# Patient Record
Sex: FEMALE | Race: BLACK OR AFRICAN AMERICAN | Employment: UNEMPLOYED | URBAN - METROPOLITAN AREA
[De-identification: names, ages, dates, MRNs, and addresses within clinical notes are randomized per-mention and may not be internally consistent; named-entity substitution may affect disease eponyms.]

---

## 2023-05-08 ENCOUNTER — OFFICE VISIT (OUTPATIENT)
Dept: RHEUMATOLOGY | Age: 38
End: 2023-05-08
Payer: COMMERCIAL

## 2023-05-08 VITALS
WEIGHT: 148.6 LBS | HEIGHT: 60 IN | BODY MASS INDEX: 29.17 KG/M2 | HEART RATE: 91 BPM | SYSTOLIC BLOOD PRESSURE: 111 MMHG | DIASTOLIC BLOOD PRESSURE: 81 MMHG

## 2023-05-08 DIAGNOSIS — M13.0 POLYARTHRITIS OF MULTIPLE SITES: ICD-10-CM

## 2023-05-08 DIAGNOSIS — R76.8 POSITIVE ANA (ANTINUCLEAR ANTIBODY): Primary | ICD-10-CM

## 2023-05-08 DIAGNOSIS — R76.8 POSITIVE ANA (ANTINUCLEAR ANTIBODY): ICD-10-CM

## 2023-05-08 LAB — ERYTHROCYTE [SEDIMENTATION RATE] IN BLOOD: 51 MM/HR (ref 0–20)

## 2023-05-08 PROCEDURE — 99244 OFF/OP CNSLTJ NEW/EST MOD 40: CPT | Performed by: INTERNAL MEDICINE

## 2023-05-08 RX ORDER — OMEPRAZOLE 40 MG/1
40 CAPSULE, DELAYED RELEASE ORAL DAILY
COMMUNITY

## 2023-05-08 RX ORDER — HYDROCODONE BITARTRATE AND ACETAMINOPHEN 7.5; 325 MG/1; MG/1
TABLET ORAL
COMMUNITY

## 2023-05-08 RX ORDER — AMITRIPTYLINE HYDROCHLORIDE 10 MG/1
TABLET, FILM COATED ORAL
COMMUNITY
Start: 2022-12-23

## 2023-05-08 RX ORDER — FAMOTIDINE 40 MG/1
TABLET, FILM COATED ORAL
COMMUNITY
Start: 2022-12-19

## 2023-05-08 RX ORDER — LISINOPRIL 20 MG/1
TABLET ORAL
COMMUNITY
Start: 2022-02-21

## 2023-05-08 RX ORDER — CETIRIZINE HYDROCHLORIDE 10 MG/1
10 TABLET ORAL DAILY
Qty: 30 TABLET | Refills: 11 | COMMUNITY
Start: 2023-04-17 | End: 2024-04-16

## 2023-05-08 RX ORDER — BALSALAZIDE DISODIUM 750 MG/1
2250 CAPSULE ORAL
COMMUNITY
Start: 2022-12-23

## 2023-05-08 ASSESSMENT — JOINT PAIN
TOTAL NUMBER OF SWOLLEN JOINTS: 0
TOTAL NUMBER OF TENDER JOINTS: 13

## 2023-05-08 ASSESSMENT — ROUTINE ASSESSMENT OF PATIENT INDEX DATA (RAPID3)
ON A SCALE OF ONE TO TEN, CONSIDERING ALL THE WAYS IN WHICH ILLNESS AND HEALTH CONDITIONS MAY AFFECT YOU AT THIS TIME, PLEASE INDICATE BELOW HOW YOU ARE DOING:: 9
ON A SCALE OF ONE TO TEN, HOW DIFFICULT WAS IT FOR YOU TO COMPLETE THE LISTED DAILY PHYSICAL TASKS OVER THE LAST WEEK: 1.3
WHEN YOU AWAKENED IN THE MORNING OVER THE LAST WEEK, PLEASE INDICATE THE AMOUNT OF TIME IT TAKES UNTIL YOU ARE AS LIMBER AS YOU WILL BE FOR THE DAY: 15 MIN
ON A SCALE OF ONE TO TEN, HOW MUCH OF A PROBLEM HAS UNUSUAL FATIGUE OR TIREDNESS BEEN FOR YOU OVER THE PAST WEEK?: 5
ON A SCALE OF ONE TO TEN, HOW MUCH PAIN HAVE YOU HAD BECAUSE OF YOUR CONDITION OVER THE PAST WEEK?: 9

## 2023-05-08 NOTE — PROGRESS NOTES
Flor Tidwell M.D.  [facility]. [facility], [address] [place] [address]  Office : [phone], Fax: [phone]      2023    Dear Hernan Jimenez,    Thank you for asking me to assist in the care of your patient Sanjuanita Clement who was seen in my office on 2023 for evaluation of joint pain with an elevated ESR. I have included the full consultation note below. I will continue to follow your patient and will forward all future office visit notes to you. If you have any questions or concerns about any aspect of your patient's care, please do not hesitate to contact me. I look forward to continuing to care for this patient with you. Sincerely,    Dr. Analilia Arteaga NOTE  Date of Visit:  2023 9:44 AM    Patient Information:  Name:  Sanjuanita Clement  :  1985  Age:  40 y.o. Gender:  female    PHYSICIAN REQUESTING CONSULTATION:  . Chief Complaint:  Chief Complaint   Patient presents with    Consultation    Abnormal Test Results    Joint Pain     History of Present Illness:  Sanjuanita Clement is a 40 y.o. female who was referred for evaluation of joint pain with a positive JEZ with an elevated ESR. On talking to the patient she states she has had joints for 2 years now which began in her hands and hips. Has been seeing a orthopedist for her hip and knee problems and has had arthroscopic procedure of the left knee last year which helped relieve the buckling sensation but the pain after the surgery did not seem to have gotten any better. Has had bilateral elbow surgery to \"clean the arthritis out:\". On talking further to the patient she states that she has been under the care of pain management and has had cortisone injection last year into the L spine which has helped relieve the pain in her lower back. Her other current joint complaints are as mentioned below.     Medical records were thoroughly reviewed and

## 2023-05-09 LAB
CCP IGA+IGG SERPL IA-ACNC: 5 UNITS (ref 0–19)
ENA SS-A AB SER-ACNC: <0.2 AI (ref 0–0.9)
ENA SS-B AB SER-ACNC: <0.2 AI (ref 0–0.9)

## 2023-12-11 ENCOUNTER — OFFICE VISIT (OUTPATIENT)
Dept: RHEUMATOLOGY | Age: 38
End: 2023-12-11
Payer: COMMERCIAL

## 2023-12-11 VITALS
DIASTOLIC BLOOD PRESSURE: 81 MMHG | WEIGHT: 134.8 LBS | BODY MASS INDEX: 26.46 KG/M2 | HEART RATE: 78 BPM | SYSTOLIC BLOOD PRESSURE: 123 MMHG | HEIGHT: 60 IN

## 2023-12-11 DIAGNOSIS — R70.0 ELEVATED ERYTHROCYTE SEDIMENTATION RATE: ICD-10-CM

## 2023-12-11 DIAGNOSIS — M51.36 DEGENERATION OF LUMBAR INTERVERTEBRAL DISC: Primary | ICD-10-CM

## 2023-12-11 LAB — ERYTHROCYTE [SEDIMENTATION RATE] IN BLOOD: 23 MM/HR (ref 0–20)

## 2023-12-11 PROCEDURE — 99214 OFFICE O/P EST MOD 30 MIN: CPT | Performed by: INTERNAL MEDICINE

## 2023-12-11 RX ORDER — METHOCARBAMOL 500 MG/1
1000 TABLET, FILM COATED ORAL 3 TIMES DAILY
COMMUNITY
Start: 2023-11-21

## 2023-12-11 ASSESSMENT — ROUTINE ASSESSMENT OF PATIENT INDEX DATA (RAPID3)
ON A SCALE OF ONE TO TEN, HOW MUCH OF A PROBLEM HAS UNUSUAL FATIGUE OR TIREDNESS BEEN FOR YOU OVER THE PAST WEEK?: 0
ON A SCALE OF ONE TO TEN, CONSIDERING ALL THE WAYS IN WHICH ILLNESS AND HEALTH CONDITIONS MAY AFFECT YOU AT THIS TIME, PLEASE INDICATE BELOW HOW YOU ARE DOING:: 8
WHEN YOU AWAKENED IN THE MORNING OVER THE LAST WEEK, PLEASE INDICATE THE AMOUNT OF TIME IT TAKES UNTIL YOU ARE AS LIMBER AS YOU WILL BE FOR THE DAY: > 1 HOUR
ON A SCALE OF ONE TO TEN, HOW MUCH PAIN HAVE YOU HAD BECAUSE OF YOUR CONDITION OVER THE PAST WEEK?: 9
ON A SCALE OF ONE TO TEN, HOW DIFFICULT WAS IT FOR YOU TO COMPLETE THE LISTED DAILY PHYSICAL TASKS OVER THE LAST WEEK: 1.7

## 2023-12-11 ASSESSMENT — JOINT PAIN
TOTAL NUMBER OF SWOLLEN JOINTS: 0
TOTAL NUMBER OF TENDER JOINTS: 6

## 2023-12-11 NOTE — PROGRESS NOTES
Meme Chiu M.D.  [address] [place] [address]., [address] [facility]., [address] [place] [address]  Office : [phone], Fax: [phone] OFFICE VISIT NOTE  Date of Visit:  2023 8:19 AM    Patient Information:  Name:  Hipolito Valenzuela  :  1985  Age:  45 y.o. Gender:  female      Ms. Gianna Canales is here today for follow-up of degeneration of lumbar intervertebral disc, joint pain and an elevated erythrocyte sedimentation rate. Last visit: 6/15/2023       History of Present Illness: On talking to the patient today she states that she takes Zyrtec and uses Flonase as needed for her seasonal allergies. Has had some shortness of breath and uses an inhaler as needed. On talking to her further she states that she does smoke occasionally. Since she last saw me she has experienced increased lower back and bilateral hip pain. She has had arthroscopic surgery of both the hips and is looking to have hip replacement surgery for which she has an appointment in Feb with the orthopedist Dr Shena Narvaez. On talking to her further she states that she has had L spine surgery on May 26 of 2021 and has had injections into the L spine last year which did not help ease the pain in her lower back. Has been seeing pain management who has her on Norco 7.5 mg twice a day. Her other current joint complaints are as mentioned below. Since the last visit, patient is feeling \"poor\". Pain: 9/10  Location: Bilateral hip and lower back pain. Bilateral elbow pain with no swelling, warmth and redness. Some pain with neck ROM. No tension headaches. Bilateral shoulder pain. Some pain in the DIP joints with some warmth but no redness. Bilateral knee pain with no swelling with occasional buckling. No warmth and redness of the knees. Some pain with no swelling of the feet on the dorsum with no warmth and redness. Quality:  Throbbing to achy to sharp pain. Modifying Factors: Constant pain.

## 2024-10-17 ENCOUNTER — OFFICE VISIT (OUTPATIENT)
Dept: RHEUMATOLOGY | Age: 39
End: 2024-10-17
Payer: COMMERCIAL

## 2024-10-17 VITALS
BODY MASS INDEX: 27.33 KG/M2 | HEART RATE: 108 BPM | DIASTOLIC BLOOD PRESSURE: 94 MMHG | HEIGHT: 60 IN | WEIGHT: 139.2 LBS | SYSTOLIC BLOOD PRESSURE: 147 MMHG

## 2024-10-17 DIAGNOSIS — R70.0 ELEVATED ERYTHROCYTE SEDIMENTATION RATE: ICD-10-CM

## 2024-10-17 DIAGNOSIS — M47.816 LOCALIZED OSTEOARTHRITIS OF LUMBAR SPINE: Primary | ICD-10-CM

## 2024-10-17 PROCEDURE — 99214 OFFICE O/P EST MOD 30 MIN: CPT | Performed by: INTERNAL MEDICINE

## 2024-10-17 RX ORDER — CETIRIZINE HYDROCHLORIDE 10 MG/1
10 TABLET ORAL DAILY
COMMUNITY

## 2024-10-17 RX ORDER — FLUTICASONE PROPIONATE 50 MCG
1 SPRAY, SUSPENSION (ML) NASAL DAILY
COMMUNITY

## 2024-10-17 ASSESSMENT — ROUTINE ASSESSMENT OF PATIENT INDEX DATA (RAPID3)
ON A SCALE OF ONE TO TEN, HOW MUCH OF A PROBLEM HAS UNUSUAL FATIGUE OR TIREDNESS BEEN FOR YOU OVER THE PAST WEEK?: 0
ON A SCALE OF ONE TO TEN, HOW DIFFICULT WAS IT FOR YOU TO COMPLETE THE LISTED DAILY PHYSICAL TASKS OVER THE LAST WEEK: 0.0
ON A SCALE OF ONE TO TEN, CONSIDERING ALL THE WAYS IN WHICH ILLNESS AND HEALTH CONDITIONS MAY AFFECT YOU AT THIS TIME, PLEASE INDICATE BELOW HOW YOU ARE DOING:: 0
ON A SCALE OF ONE TO TEN, HOW MUCH PAIN HAVE YOU HAD BECAUSE OF YOUR CONDITION OVER THE PAST WEEK?: 0

## 2024-10-17 ASSESSMENT — JOINT PAIN
TOTAL NUMBER OF SWOLLEN JOINTS: 0
TOTAL NUMBER OF TENDER JOINTS: 0

## 2024-10-17 NOTE — PROGRESS NOTES
[facility]  Dorota Call M.D.  [address] [facility] , [address]   [facility]  Office : [phone], Fax: [phone]     RHEUMATOLOGY OFFICE VISIT NOTE  Date of Visit:  10/17/2024 8:02 PM    Patient Information:  Name:  Stephanie Roach  :  1985  Age:  39 y.o.   Gender:  female      Ms. 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     Last visit: 23    History of Present Illness: On talking to the patient today she states that she has had some congestion with occasional headaches.  For her seasonal allergies she uses Zyrtec and Flonase as needed.  Patient states that since she did have bilateral hip joint replacements she has not had any more pain in her hips.  She does still see pain management for her neck and lower back pain though. Her left hip was replaced in 2024 and right hip was replaced in 2024.    Since the last visit, patient is feeling \"very good\".    Pain: 0/10  Location:  Some neck and lower back pain. No tension headaches. Some mid back pain for which she is on hydrocodone. No shoulder blade pain. Bilateral feet swelling with no warmth and redness.   Quality:  Deep achy to sharp pain.   Modifying Factors:  Laying on her back and walking can worsen the mid back pain.   Associated Symptoms:  No tingling, numbness or pain down the arms or legs with intermittent tingling and numbness worse in the right than the left in spite of having CTS release of the left hand. No LE and UE weakness. No limitations with her ADL's.         10/17/2024     1:00 PM   DMARD/Biologic   AM Stiffness None   Pain 0   Fatigue 0   MDHAQ 0   Patient Global Score 0     Last TB screen:   TB result: Negative     Current dose of steroids:none  How long on current dose of steroids:na  How long on continuous steroid therapy:na     Past DMARDs, if applicable (methotrexate,